# Patient Record
Sex: FEMALE | Race: WHITE | Employment: FULL TIME | URBAN - NONMETROPOLITAN AREA
[De-identification: names, ages, dates, MRNs, and addresses within clinical notes are randomized per-mention and may not be internally consistent; named-entity substitution may affect disease eponyms.]

---

## 2022-12-28 ENCOUNTER — APPOINTMENT (OUTPATIENT)
Dept: GENERAL RADIOLOGY | Age: 27
End: 2022-12-28
Payer: COMMERCIAL

## 2022-12-28 ENCOUNTER — HOSPITAL ENCOUNTER (EMERGENCY)
Age: 27
Discharge: ANOTHER HOSPITAL | End: 2022-12-29
Attending: EMERGENCY MEDICINE
Payer: COMMERCIAL

## 2022-12-28 VITALS
BODY MASS INDEX: 33.32 KG/M2 | HEIGHT: 65 IN | RESPIRATION RATE: 18 BRPM | DIASTOLIC BLOOD PRESSURE: 61 MMHG | TEMPERATURE: 98.4 F | HEART RATE: 108 BPM | SYSTOLIC BLOOD PRESSURE: 112 MMHG | WEIGHT: 200 LBS | OXYGEN SATURATION: 100 %

## 2022-12-28 DIAGNOSIS — E10.10 DIABETIC KETOACIDOSIS WITHOUT COMA ASSOCIATED WITH TYPE 1 DIABETES MELLITUS (HCC): Primary | ICD-10-CM

## 2022-12-28 LAB
ALBUMIN SERPL-MCNC: 4.4 GM/DL (ref 3.4–5)
ALP BLD-CCNC: 90 IU/L (ref 40–129)
ALT SERPL-CCNC: 11 U/L (ref 10–40)
ANION GAP SERPL CALCULATED.3IONS-SCNC: 19 MMOL/L (ref 4–16)
ANION GAP SERPL CALCULATED.3IONS-SCNC: 21 MMOL/L (ref 4–16)
AST SERPL-CCNC: 13 IU/L (ref 15–37)
BASE EXCESS MIXED: 11.7 (ref 0–2.3)
BASE EXCESS: ABNORMAL (ref 0–2.4)
BASOPHILS ABSOLUTE: 0.1 K/CU MM
BASOPHILS RELATIVE PERCENT: 0.3 % (ref 0–1)
BETA-HYDROXYBUTYRATE: >20.8 MG/DL (ref 0–3)
BILIRUB SERPL-MCNC: 0.7 MG/DL (ref 0–1)
BILIRUBIN URINE: NEGATIVE MG/DL
BLOOD, URINE: NEGATIVE
BUN BLDV-MCNC: 18 MG/DL (ref 6–23)
BUN BLDV-MCNC: 20 MG/DL (ref 6–23)
CALCIUM SERPL-MCNC: 10.3 MG/DL (ref 8.3–10.6)
CALCIUM SERPL-MCNC: 8.6 MG/DL (ref 8.3–10.6)
CHLORIDE BLD-SCNC: 106 MMOL/L (ref 99–110)
CHLORIDE BLD-SCNC: 97 MMOL/L (ref 99–110)
CHP ED QC CHECK: NORMAL
CHP ED QC CHECK: NORMAL
CLARITY: CLEAR
CO2 CONTENT: 14.8 MMOL/L (ref 19–24)
CO2: 13 MMOL/L (ref 21–32)
CO2: 16 MMOL/L (ref 21–32)
COLOR: YELLOW
COMMENT UA: ABNORMAL
CREAT SERPL-MCNC: 0.7 MG/DL (ref 0.6–1.1)
CREAT SERPL-MCNC: 0.7 MG/DL (ref 0.6–1.1)
DIFFERENTIAL TYPE: ABNORMAL
EOSINOPHILS ABSOLUTE: 0 K/CU MM
EOSINOPHILS RELATIVE PERCENT: 0.1 % (ref 0–3)
GFR SERPL CREATININE-BSD FRML MDRD: >60 ML/MIN/1.73M2
GFR SERPL CREATININE-BSD FRML MDRD: >60 ML/MIN/1.73M2
GLUCOSE BLD-MCNC: 232 MG/DL
GLUCOSE BLD-MCNC: 232 MG/DL (ref 70–99)
GLUCOSE BLD-MCNC: 287 MG/DL
GLUCOSE BLD-MCNC: 287 MG/DL (ref 70–99)
GLUCOSE BLD-MCNC: 331 MG/DL (ref 70–99)
GLUCOSE BLD-MCNC: 350 MG/DL (ref 70–99)
GLUCOSE BLD-MCNC: 362 MG/DL (ref 70–99)
GLUCOSE BLD-MCNC: 474 MG/DL (ref 70–99)
GLUCOSE BLD-MCNC: 488 MG/DL (ref 70–99)
GLUCOSE, URINE: >1000 MG/DL
HCG QUALITATIVE: NEGATIVE
HCO3 VENOUS: 13.8 MMOL/L (ref 19–25)
HCT VFR BLD CALC: 40.8 % (ref 37–47)
HEMOGLOBIN: 13.5 GM/DL (ref 12.5–16)
IMMATURE NEUTROPHIL %: 0.4 % (ref 0–0.43)
KETONES, URINE: >80 MG/DL
LACTIC ACID, SEPSIS: 3.3 MMOL/L (ref 0.5–1.9)
LACTIC ACID, SEPSIS: 4 MMOL/L (ref 0.5–1.9)
LEUKOCYTE ESTERASE, URINE: NEGATIVE
LYMPHOCYTES ABSOLUTE: 1.7 K/CU MM
LYMPHOCYTES RELATIVE PERCENT: 8.8 % (ref 24–44)
MCH RBC QN AUTO: 28.4 PG (ref 27–31)
MCHC RBC AUTO-ENTMCNC: 33.1 % (ref 32–36)
MCV RBC AUTO: 85.9 FL (ref 78–100)
MONOCYTES ABSOLUTE: 0.5 K/CU MM
MONOCYTES RELATIVE PERCENT: 2.4 % (ref 0–4)
NITRITE URINE, QUANTITATIVE: NEGATIVE
O2 SAT, VEN: 90.9 % (ref 50–70)
PCO2, VEN: 30 MMHG (ref 38–52)
PDW BLD-RTO: 13.4 % (ref 11.7–14.9)
PH VENOUS: 7.27 (ref 7.32–7.42)
PH, URINE: 5.5 (ref 5–8)
PLATELET # BLD: 303 K/CU MM (ref 140–440)
PMV BLD AUTO: 10.8 FL (ref 7.5–11.1)
PO2, VEN: 67.8 MMHG (ref 28–48)
POTASSIUM SERPL-SCNC: 3.6 MMOL/L (ref 3.5–5.1)
POTASSIUM SERPL-SCNC: 4.8 MMOL/L (ref 3.5–5.1)
PROTEIN UA: NEGATIVE MG/DL
RBC # BLD: 4.75 M/CU MM (ref 4.2–5.4)
SEGMENTED NEUTROPHILS ABSOLUTE COUNT: 16.7 K/CU MM
SEGMENTED NEUTROPHILS RELATIVE PERCENT: 88 % (ref 36–66)
SODIUM BLD-SCNC: 134 MMOL/L (ref 135–145)
SODIUM BLD-SCNC: 138 MMOL/L (ref 135–145)
SOURCE, BLOOD GAS: ABNORMAL
SPECIFIC GRAVITY UA: 1.02 (ref 1–1.03)
TOTAL IMMATURE NEUTOROPHIL: 0.07 K/CU MM
TOTAL PROTEIN: 7.7 GM/DL (ref 6.4–8.2)
UROBILINOGEN, URINE: 0.2 MG/DL (ref 0.2–1)
WBC # BLD: 19 K/CU MM (ref 4–10.5)

## 2022-12-28 PROCEDURE — 96375 TX/PRO/DX INJ NEW DRUG ADDON: CPT

## 2022-12-28 PROCEDURE — 6360000002 HC RX W HCPCS: Performed by: EMERGENCY MEDICINE

## 2022-12-28 PROCEDURE — 96365 THER/PROPH/DIAG IV INF INIT: CPT

## 2022-12-28 PROCEDURE — 85025 COMPLETE CBC W/AUTO DIFF WBC: CPT

## 2022-12-28 PROCEDURE — 84703 CHORIONIC GONADOTROPIN ASSAY: CPT

## 2022-12-28 PROCEDURE — 80048 BASIC METABOLIC PNL TOTAL CA: CPT

## 2022-12-28 PROCEDURE — 2580000003 HC RX 258: Performed by: EMERGENCY MEDICINE

## 2022-12-28 PROCEDURE — 71045 X-RAY EXAM CHEST 1 VIEW: CPT

## 2022-12-28 PROCEDURE — 82962 GLUCOSE BLOOD TEST: CPT

## 2022-12-28 PROCEDURE — 6370000000 HC RX 637 (ALT 250 FOR IP): Performed by: EMERGENCY MEDICINE

## 2022-12-28 PROCEDURE — 82805 BLOOD GASES W/O2 SATURATION: CPT

## 2022-12-28 PROCEDURE — 82010 KETONE BODYS QUAN: CPT

## 2022-12-28 PROCEDURE — 81003 URINALYSIS AUTO W/O SCOPE: CPT

## 2022-12-28 PROCEDURE — 80053 COMPREHEN METABOLIC PANEL: CPT

## 2022-12-28 PROCEDURE — 99285 EMERGENCY DEPT VISIT HI MDM: CPT

## 2022-12-28 PROCEDURE — 96366 THER/PROPH/DIAG IV INF ADDON: CPT

## 2022-12-28 PROCEDURE — 83605 ASSAY OF LACTIC ACID: CPT

## 2022-12-28 PROCEDURE — 96361 HYDRATE IV INFUSION ADD-ON: CPT

## 2022-12-28 PROCEDURE — 96376 TX/PRO/DX INJ SAME DRUG ADON: CPT

## 2022-12-28 RX ORDER — SODIUM CHLORIDE AND POTASSIUM CHLORIDE 150; 900 MG/100ML; MG/100ML
INJECTION, SOLUTION INTRAVENOUS CONTINUOUS
Status: DISCONTINUED | OUTPATIENT
Start: 2022-12-28 | End: 2022-12-29 | Stop reason: HOSPADM

## 2022-12-28 RX ORDER — 0.9 % SODIUM CHLORIDE 0.9 %
2000 INTRAVENOUS SOLUTION INTRAVENOUS ONCE
Status: COMPLETED | OUTPATIENT
Start: 2022-12-28 | End: 2022-12-28

## 2022-12-28 RX ORDER — DEXTROSE AND SODIUM CHLORIDE 5; .45 G/100ML; G/100ML
INJECTION, SOLUTION INTRAVENOUS CONTINUOUS
Status: DISCONTINUED | OUTPATIENT
Start: 2022-12-28 | End: 2022-12-29 | Stop reason: HOSPADM

## 2022-12-28 RX ORDER — ONDANSETRON 2 MG/ML
4 INJECTION INTRAMUSCULAR; INTRAVENOUS EVERY 30 MIN PRN
Status: DISCONTINUED | OUTPATIENT
Start: 2022-12-28 | End: 2022-12-29 | Stop reason: HOSPADM

## 2022-12-28 RX ORDER — SODIUM CHLORIDE 9 MG/ML
INJECTION, SOLUTION INTRAVENOUS CONTINUOUS
Status: DISCONTINUED | OUTPATIENT
Start: 2022-12-28 | End: 2022-12-28

## 2022-12-28 RX ORDER — DEXTROSE MONOHYDRATE 100 MG/ML
INJECTION, SOLUTION INTRAVENOUS CONTINUOUS PRN
Status: DISCONTINUED | OUTPATIENT
Start: 2022-12-28 | End: 2022-12-29 | Stop reason: HOSPADM

## 2022-12-28 RX ADMIN — POTASSIUM CHLORIDE AND SODIUM CHLORIDE: 900; 150 INJECTION, SOLUTION INTRAVENOUS at 21:32

## 2022-12-28 RX ADMIN — INSULIN HUMAN 10 UNITS: 100 INJECTION, SOLUTION PARENTERAL at 17:03

## 2022-12-28 RX ADMIN — SODIUM CHLORIDE 2000 ML: 9 INJECTION, SOLUTION INTRAVENOUS at 17:08

## 2022-12-28 RX ADMIN — DEXTROSE AND SODIUM CHLORIDE: 5; 450 INJECTION, SOLUTION INTRAVENOUS at 23:43

## 2022-12-28 RX ADMIN — ONDANSETRON 4 MG: 2 INJECTION INTRAMUSCULAR; INTRAVENOUS at 17:01

## 2022-12-28 RX ADMIN — SODIUM CHLORIDE: 9 INJECTION, SOLUTION INTRAVENOUS at 19:49

## 2022-12-28 RX ADMIN — INSULIN HUMAN 10 UNITS: 100 INJECTION, SOLUTION PARENTERAL at 19:24

## 2022-12-28 RX ADMIN — SODIUM CHLORIDE 0.1 UNITS/KG/HR: 9 INJECTION, SOLUTION INTRAVENOUS at 21:03

## 2022-12-28 ASSESSMENT — PAIN DESCRIPTION - LOCATION: LOCATION: GENERALIZED

## 2022-12-28 ASSESSMENT — PAIN SCALES - GENERAL: PAINLEVEL_OUTOF10: 3

## 2022-12-28 ASSESSMENT — PAIN - FUNCTIONAL ASSESSMENT: PAIN_FUNCTIONAL_ASSESSMENT: 0-10

## 2022-12-28 NOTE — ED TRIAGE NOTES
Patient arrived ambulatory with c/o hyperglycemia. 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 She noticed her glucose was running high and is not coming down with her insulin. Patient has an insulin pump and dexcom. Respirations equal and unlabored, skin PWD.

## 2022-12-28 NOTE — ED PROVIDER NOTES
Emergency Department Encounter  Location: [facility] [address] [place] [address]    Patient: Everett Lopez  MRN: [id number]  : 1995  Date of evaluation: 2022  ED Provider: Angélica Lopez DO, FACEP    Chief Complaint:    Hyperglycemia and Nausea    Eek:  Everett Lopez is a 33 y.o. female that presents to the emergency department history of insulin-dependent diabetes. The patient has an insulin pump. 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 The patient states she did not eat very well yesterday and had some poor nights of sleep. She traveled here from [place] yesterday and when she arrived here she slept for an excessively long time and her insulin pump ran out. The patient's blood sugar has been reading greater than 400 for the last few hours and she has been bolusing insulin. She states she is bolus total of 83 units of NovoLog and her blood pressure remains in the greater than 400 range. It was 435 upon arrival.  The patient has been vomiting and has been having some dry heaves. She did not check her urine ketones. She denies upper respiratory symptoms or infective process at this time. She states normally she is able to get her self back on track with insulin boluses when something like this happens but today she is not able to get her sugar to come down with insulin boluses. ROS - see HPI, below listed is current ROS at time of my eval:  At least 10 systems reviewed and otherwise acutely negative except as in the [address]   General:  No fevers, no chills, no weakness  Eyes:  No recent vison changes, no discharge  ENT:  No sore throat, no nasal congestion, no hearing changes  Cardiovascular:  No chest pain, no palpitations  Respiratory:  No shortness of breath, no cough, no wheezing  Gastrointestinal: Positive for nausea with vomiting no diarrhea  Musculoskeletal:  No muscle pain, no joint pain  Skin:  No rash, no pruritis, no easy bruising  Neurologic: No speech problems, no headache, no extremity numbness, no extremity tingling, no extremity weakness  Psychiatric:  No anxiety  Genitourinary:  No dysuria, no hematuria  Endocrine:  No unexpected weight gain, no unexpected weight loss  Extremities:  no edema, no pain    No past medical history on file. No past surgical history on file. No family history on file. Social History     Socioeconomic History    Marital status:      Spouse name: Not on file    Number of children: Not on file    Years of education: Not on file    Highest education level: Not on file   Occupational History    Not on file   Tobacco Use    Smoking status: Not on file    Smokeless tobacco: Not on file   Substance and Sexual Activity    Alcohol use: Not on file    Drug use: Not on file    Sexual activity: Not on file   Other Topics Concern    Not on file   Social History Narrative    Not on file     Social Determinants of Health     Financial Resource Strain: Not on file   Food Insecurity: Not on file   Transportation Needs: Not on file   Physical Activity: Not on file   Stress: Not on file   Social Connections: Not on file   Intimate Partner Violence: Not on file   Housing Stability: Not on file     Current Facility-Administered Medications   Medication Dose Route Frequency Provider Last Rate Last Admin    ondansetron (ZOFRAN) injection 4 mg  4 mg IntraVENous Q30 Min PRN Deborah Ferrera DO   4 mg at 12/28/22 1701    0.9 % sodium chloride infusion   IntraVENous Continuous Deborah Ferrera DO         No current outpatient medications on file. No Known Allergies    Nursing Notes Reviewed    Physical Exam:  ED Triage Vitals [12/28/22 1634]   Enc Vitals Group      /60      Heart Rate (!) 109      Resp 18      Temp 98.4 °F (36.9 °C)      Temp Source Oral      SpO2 99 %      Weight 200 lb (90.7 kg)      Height 5' 5\" (1.651 m)      Head Circumference       Peak Flow       Pain Score       Pain Loc       Pain Edu? Excl.  in [address]? GENERAL APPEARANCE: Awake and alert. Cooperative. No acute distress. Non-toxic in appearance. She does not smell ketones. HEAD: Normocephalic. Atraumatic. EYES: EOM's grossly intact. Sclera anicteric. ENT: Tolerates saliva. No trismus. NECK: Supple. Trachea midline. CARDIO: RRR. Radial pulse 2+. LUNGS: Respirations unlabored. CTAB. No wheezes rales or rhonchi  ABDOMEN: Soft. Non-distended. Non-tender. No guarding or rebound  EXTREMITIES: No acute deformities. SKIN: Warm and dry. NEUROLOGICAL: No gross facial drooping. Moves all 4 extremities spontaneously. PSYCHIATRIC: Normal mood.      Labs:  Results for orders placed or performed during the hospital encounter of 12/28/22   CBC with Auto Differential   Result Value Ref Range    WBC 19.0 (H) 4.0 - 10.5 K/CU MM    RBC 4.75 4.2 - 5.4 M/CU MM    Hemoglobin 13.5 12.5 - 16.0 GM/DL    Hematocrit 40.8 37 - 47 %    MCV 85.9 78 - 100 FL    MCH 28.4 27 - 31 PG    MCHC 33.1 32.0 - 36.0 %    RDW 13.4 11.7 - 14.9 %    Platelets 746 141 - 585 K/CU MM    MPV 10.8 7.5 - 11.1 FL    Differential Type AUTOMATED DIFFERENTIAL     Segs Relative 88.0 (H) 36 - 66 %    Lymphocytes % 8.8 (L) 24 - 44 %    Monocytes % 2.4 0 - 4 %    Eosinophils % 0.1 0 - 3 %    Basophils % 0.3 0 - 1 %    Segs Absolute 16.7 K/CU MM    Lymphocytes Absolute 1.7 K/CU MM    Monocytes Absolute 0.5 K/CU MM    Eosinophils Absolute 0.0 K/CU MM    Basophils Absolute 0.1 K/CU MM    Immature Neutrophil % 0.4 0 - 0.43 %    Total Immature Neutrophil 0.07 K/CU MM   Comprehensive Metabolic Panel w/ Reflex to MG   Result Value Ref Range    Sodium 134 (L) 135 - 145 MMOL/L    Potassium 4.8 3.5 - 5.1 MMOL/L    Chloride 97 (L) 99 - 110 mMol/L    CO2 16 (L) 21 - 32 MMOL/L    BUN 20 6 - 23 MG/DL    Creatinine 0.7 0.6 - 1.1 MG/DL    Est, Glom Filt Rate >60 >60 mL/min/1.73m2    Glucose 488 (HH) 70 - 99 MG/DL    Calcium 10.3 8.3 - 10.6 MG/DL    Albumin 4.4 3.4 - 5.0 GM/DL    Total Protein 7.7 6.4 - 8.2 GM/DL Total Bilirubin 0.7 0.0 - 1.0 MG/DL    ALT 11 10 - 40 U/L    AST 13 (L) 15 - 37 IU/L    Alkaline Phosphatase 90 40 - 129 IU/L    Anion Gap 21 (H) 4 - 16   Beta-Hydroxybutyrate   Result Value Ref Range    Beta-Hydroxybutyrate >20.8 (H) 0.0 - 3.0 MG/DL   Urinalysis   Result Value Ref Range    Color, UA YELLOW YELLOW    Clarity, UA CLEAR CLEAR    Glucose, Urine >1,000 (A) NEGATIVE MG/DL    Bilirubin Urine NEGATIVE NEGATIVE MG/DL    Ketones, Urine >80 (A) NEGATIVE MG/DL    Specific Gravity, UA 1.025 1.001 - 1.035    Blood, Urine NEGATIVE NEGATIVE    pH, Urine 5.5 5.0 - 8.0    Protein, UA NEGATIVE NEGATIVE MG/DL    Urobilinogen, Urine 0.2 0.2 - 1.0 MG/DL    Nitrite Urine, Quantitative NEGATIVE NEGATIVE    Leukocyte Esterase, Urine NEGATIVE NEGATIVE    Urinalysis Comments       Microscopic exam not performed based on chemical results unless requested in original order. HCG Serum, Qualitative   Result Value Ref Range    hCG Qual NEGATIVE    POCT Glucose   Result Value Ref Range    POC Glucose 474 (H) 70 - 99 MG/DL   POCT Venous   Result Value Ref Range    pH, Rbuio 7.27 (L) 7.32 - 7.42    pCO2, Rubio 30.0 (L) 38 - 52 mmHG    pO2, Rubio 67.8 (H) 28 - 48 mmHG    Base Exc, Mixed 11.7 (H) 0 - 2.3    Base Excess MINUS 0 - 2.4    HCO3, Venous 13.8 (L) 19 - 25 MMOL/L    O2 Sat, Rubio 90.9 (H) 50 - 70 %    CO2 Content 14.8 (L) 19 - 24 MMOL/L    Source: Venous    POCT Glucose   Result Value Ref Range    POC Glucose 350 (H) 70 - 99 MG/DL         Radiographs (if obtained):  [] The following radiograph was interpreted by myself in the absence of a radiologist:  [x] Radiologist's Report reviewed at time of ED visit:  XR CHEST PORTABLE    (Results Pending)       ED Course and MDM:  This patient presents to the emergency department with nausea and vomiting and a blood sugar greater than 400. The patient has an insulin pump.   She states she replaced the site of her pump and opened a new vial of insulin but her blood sugar Reading greater than 400 which is the max that she has on her automatic reader associated with the pump. It has been giving her insulin through the day and she states she is had total of 83 units throughout the day. Here in the ER the patient is showing signs of DKA. She has ketones greater than 20 in her urine and her blood sugar was initially registered in the 480 range. She has been given 10 units of insulin IV and her blood sugar did come down into the 300s. She is feeling much better after having 2 L of fluid however her sugar started to go back up. She is had another 10 units of insulin. Her sugar most recently read 351. Her urine shows no evidence of infection and the chest x-ray is pending. She is having no symptoms of upper respiratory symptoms or other signs of infection. Her potassium is normal.  She has required no supplementation at this point. The patient may need to be admitted to the hospital but she is from out of town and she would like to stay out of the hospital if possible. Care will be transferred to the [facility] doctor with the chest x-ray and the serum lactate pending. Please see her note regarding final disposition of this patient based on the findings of the x-ray and her response to our treatment. Final Impression:  1. Diabetic ketoacidosis without coma associated with type 1 diabetes mellitus ([facility] [address]      DISPOSITION     Patient referred to: No follow-up provider specified.   Discharge medications:  New Prescriptions    No medications on file     (Please note that portions of this note may have been completed with a voice recognition program. Efforts were made to edit the dictations but occasionally words are mis-transcribed.)    Irina Blanco DO, [address]  Board certified in [address] [place] [address]  12/28/22 2483

## 2022-12-29 ENCOUNTER — HOSPITAL ENCOUNTER (INPATIENT)
Age: 27
LOS: 1 days | Discharge: HOME | DRG: 639 | End: 2022-12-29
Attending: STUDENT IN AN ORGANIZED HEALTH CARE EDUCATION/TRAINING PROGRAM | Admitting: STUDENT IN AN ORGANIZED HEALTH CARE EDUCATION/TRAINING PROGRAM
Payer: COMMERCIAL

## 2022-12-29 VITALS
BODY MASS INDEX: 32.25 KG/M2 | DIASTOLIC BLOOD PRESSURE: 73 MMHG | SYSTOLIC BLOOD PRESSURE: 110 MMHG | OXYGEN SATURATION: 99 % | WEIGHT: 193.56 LBS | TEMPERATURE: 98.9 F | HEART RATE: 95 BPM | HEIGHT: 65 IN | RESPIRATION RATE: 18 BRPM

## 2022-12-29 PROBLEM — E13.10 SECONDARY DM WITH DKA (HCC): Status: ACTIVE | Noted: 2022-12-29

## 2022-12-29 PROBLEM — E10.10 DKA, TYPE 1, NOT AT GOAL (HCC): Status: ACTIVE | Noted: 2022-12-29

## 2022-12-29 LAB
ADENOVIRUS DETECTION BY PCR: NOT DETECTED
ANION GAP SERPL CALCULATED.3IONS-SCNC: 11 MMOL/L (ref 4–16)
BASOPHILS ABSOLUTE: 0.1 K/CU MM
BASOPHILS RELATIVE PERCENT: 0.4 % (ref 0–1)
BETA-HYDROXYBUTYRATE: 11.7 MG/DL (ref 0–3)
BORDETELLA PARAPERTUSSIS BY PCR: NOT DETECTED
BORDETELLA PERTUSSIS PCR: NOT DETECTED
BUN BLDV-MCNC: 14 MG/DL (ref 6–23)
CALCIUM SERPL-MCNC: 8.5 MG/DL (ref 8.3–10.6)
CHLAMYDOPHILA PNEUMONIA PCR: NOT DETECTED
CHLORIDE BLD-SCNC: 110 MMOL/L (ref 99–110)
CO2: 18 MMOL/L (ref 21–32)
CORONAVIRUS 229E PCR: NOT DETECTED
CORONAVIRUS HKU1 PCR: NOT DETECTED
CORONAVIRUS NL63 PCR: NOT DETECTED
CORONAVIRUS OC43 PCR: NOT DETECTED
CREAT SERPL-MCNC: 0.5 MG/DL (ref 0.6–1.1)
DIFFERENTIAL TYPE: ABNORMAL
EOSINOPHILS ABSOLUTE: 0.2 K/CU MM
EOSINOPHILS RELATIVE PERCENT: 1.1 % (ref 0–3)
GFR SERPL CREATININE-BSD FRML MDRD: >60 ML/MIN/1.73M2
GLUCOSE BLD-MCNC: 104 MG/DL (ref 70–99)
GLUCOSE BLD-MCNC: 130 MG/DL (ref 70–99)
GLUCOSE BLD-MCNC: 168 MG/DL (ref 70–99)
GLUCOSE BLD-MCNC: 171 MG/DL (ref 70–99)
GLUCOSE BLD-MCNC: 198 MG/DL (ref 70–99)
GLUCOSE BLD-MCNC: 224 MG/DL (ref 70–99)
GLUCOSE BLD-MCNC: 248 MG/DL (ref 70–99)
GLUCOSE BLD-MCNC: 261 MG/DL (ref 70–99)
GLUCOSE BLD-MCNC: 346 MG/DL (ref 70–99)
GLUCOSE BLD-MCNC: 89 MG/DL (ref 70–99)
GLUCOSE BLD-MCNC: 93 MG/DL (ref 70–99)
HCT VFR BLD CALC: 34.8 % (ref 37–47)
HEMOGLOBIN: 11 GM/DL (ref 12.5–16)
HUMAN METAPNEUMOVIRUS PCR: NOT DETECTED
IMMATURE NEUTROPHIL %: 0.3 % (ref 0–0.43)
INFLUENZA A BY PCR: NOT DETECTED
INFLUENZA A H1 (2009) PCR: NOT DETECTED
INFLUENZA A H1 PANDEMIC PCR: NOT DETECTED
INFLUENZA A H3 PCR: NOT DETECTED
INFLUENZA B BY PCR: NOT DETECTED
LACTATE: 1.4 MMOL/L (ref 0.5–1.9)
LYMPHOCYTES ABSOLUTE: 4.3 K/CU MM
LYMPHOCYTES RELATIVE PERCENT: 27.1 % (ref 24–44)
MAGNESIUM: 1.7 MG/DL (ref 1.8–2.4)
MCH RBC QN AUTO: 28.1 PG (ref 27–31)
MCHC RBC AUTO-ENTMCNC: 31.6 % (ref 32–36)
MCV RBC AUTO: 88.8 FL (ref 78–100)
MONOCYTES ABSOLUTE: 1.3 K/CU MM
MONOCYTES RELATIVE PERCENT: 8.1 % (ref 0–4)
MYCOPLASMA PNEUMONIAE PCR: NOT DETECTED
NUCLEATED RBC %: 0 %
PARAINFLUENZA 1 PCR: NOT DETECTED
PARAINFLUENZA 2 PCR: NOT DETECTED
PARAINFLUENZA 3 PCR: NOT DETECTED
PARAINFLUENZA 4 PCR: NOT DETECTED
PDW BLD-RTO: 13.7 % (ref 11.7–14.9)
PHOSPHORUS: 1.6 MG/DL (ref 2.5–4.9)
PLATELET # BLD: 287 K/CU MM (ref 140–440)
PMV BLD AUTO: 10.8 FL (ref 7.5–11.1)
POTASSIUM SERPL-SCNC: 4.2 MMOL/L (ref 3.5–5.1)
RBC # BLD: 3.92 M/CU MM (ref 4.2–5.4)
RHINOVIRUS ENTEROVIRUS PCR: NOT DETECTED
RSV PCR: NOT DETECTED
SARS-COV-2: NOT DETECTED
SEGMENTED NEUTROPHILS ABSOLUTE COUNT: 10 K/CU MM
SEGMENTED NEUTROPHILS RELATIVE PERCENT: 63 % (ref 36–66)
SODIUM BLD-SCNC: 139 MMOL/L (ref 135–145)
TOTAL IMMATURE NEUTOROPHIL: 0.05 K/CU MM
TOTAL NUCLEATED RBC: 0 K/CU MM
WBC # BLD: 15.8 K/CU MM (ref 4–10.5)

## 2022-12-29 PROCEDURE — 0202U NFCT DS 22 TRGT SARS-COV-2: CPT

## 2022-12-29 PROCEDURE — 83735 ASSAY OF MAGNESIUM: CPT

## 2022-12-29 PROCEDURE — 85025 COMPLETE CBC W/AUTO DIFF WBC: CPT

## 2022-12-29 PROCEDURE — 82962 GLUCOSE BLOOD TEST: CPT

## 2022-12-29 PROCEDURE — 84100 ASSAY OF PHOSPHORUS: CPT

## 2022-12-29 PROCEDURE — 2580000003 HC RX 258: Performed by: STUDENT IN AN ORGANIZED HEALTH CARE EDUCATION/TRAINING PROGRAM

## 2022-12-29 PROCEDURE — 83605 ASSAY OF LACTIC ACID: CPT

## 2022-12-29 PROCEDURE — 6370000000 HC RX 637 (ALT 250 FOR IP): Performed by: STUDENT IN AN ORGANIZED HEALTH CARE EDUCATION/TRAINING PROGRAM

## 2022-12-29 PROCEDURE — 94761 N-INVAS EAR/PLS OXIMETRY MLT: CPT

## 2022-12-29 PROCEDURE — 2000000000 HC ICU R&B

## 2022-12-29 PROCEDURE — 6360000002 HC RX W HCPCS: Performed by: STUDENT IN AN ORGANIZED HEALTH CARE EDUCATION/TRAINING PROGRAM

## 2022-12-29 PROCEDURE — 82010 KETONE BODYS QUAN: CPT

## 2022-12-29 PROCEDURE — 80048 BASIC METABOLIC PNL TOTAL CA: CPT

## 2022-12-29 PROCEDURE — 2500000003 HC RX 250 WO HCPCS: Performed by: STUDENT IN AN ORGANIZED HEALTH CARE EDUCATION/TRAINING PROGRAM

## 2022-12-29 RX ORDER — METHOCARBAMOL 750 MG/1
750 TABLET, FILM COATED ORAL PRN
COMMUNITY

## 2022-12-29 RX ORDER — POLYETHYLENE GLYCOL 3350 17 G/17G
17 POWDER, FOR SOLUTION ORAL DAILY PRN
Status: DISCONTINUED | OUTPATIENT
Start: 2022-12-29 | End: 2022-12-29 | Stop reason: HOSPADM

## 2022-12-29 RX ORDER — ONDANSETRON 4 MG/1
4 TABLET, ORALLY DISINTEGRATING ORAL EVERY 8 HOURS PRN
Status: DISCONTINUED | OUTPATIENT
Start: 2022-12-29 | End: 2022-12-29 | Stop reason: HOSPADM

## 2022-12-29 RX ORDER — NORGESTIMATE AND ETHINYL ESTRADIOL 0.25-0.035
1 KIT ORAL DAILY
COMMUNITY

## 2022-12-29 RX ORDER — INSULIN LISPRO 100 [IU]/ML
0-4 INJECTION, SOLUTION INTRAVENOUS; SUBCUTANEOUS NIGHTLY
Status: DISCONTINUED | OUTPATIENT
Start: 2022-12-29 | End: 2022-12-29 | Stop reason: HOSPADM

## 2022-12-29 RX ORDER — INSULIN LISPRO 100 [IU]/ML
0-16 INJECTION, SOLUTION INTRAVENOUS; SUBCUTANEOUS
Qty: 15 ML | Refills: 0 | Status: SHIPPED | OUTPATIENT
Start: 2022-12-29 | End: 2022-12-29 | Stop reason: HOSPADM

## 2022-12-29 RX ORDER — SODIUM CHLORIDE 450 MG/100ML
INJECTION, SOLUTION INTRAVENOUS CONTINUOUS
Status: DISCONTINUED | OUTPATIENT
Start: 2022-12-29 | End: 2022-12-29

## 2022-12-29 RX ORDER — DEXTROSE, SODIUM CHLORIDE, AND POTASSIUM CHLORIDE 5; .45; .15 G/100ML; G/100ML; G/100ML
INJECTION INTRAVENOUS CONTINUOUS PRN
Status: DISCONTINUED | OUTPATIENT
Start: 2022-12-29 | End: 2022-12-29

## 2022-12-29 RX ORDER — POTASSIUM CHLORIDE 7.45 MG/ML
10 INJECTION INTRAVENOUS PRN
Status: DISCONTINUED | OUTPATIENT
Start: 2022-12-29 | End: 2022-12-29 | Stop reason: HOSPADM

## 2022-12-29 RX ORDER — FERROUS SULFATE 325(65) MG
325 TABLET ORAL EVERY OTHER DAY
COMMUNITY

## 2022-12-29 RX ORDER — ENOXAPARIN SODIUM 100 MG/ML
40 INJECTION SUBCUTANEOUS DAILY
Status: DISCONTINUED | OUTPATIENT
Start: 2022-12-29 | End: 2022-12-29 | Stop reason: HOSPADM

## 2022-12-29 RX ORDER — INSULIN LISPRO 100 [IU]/ML
0-16 INJECTION, SOLUTION INTRAVENOUS; SUBCUTANEOUS
Status: DISCONTINUED | OUTPATIENT
Start: 2022-12-29 | End: 2022-12-29 | Stop reason: HOSPADM

## 2022-12-29 RX ORDER — LORATADINE 10 MG/1
10 TABLET ORAL DAILY
COMMUNITY

## 2022-12-29 RX ORDER — LISDEXAMFETAMINE DIMESYLATE 40 MG/1
40 CAPSULE ORAL DAILY
COMMUNITY

## 2022-12-29 RX ORDER — INSULIN GLARGINE 100 [IU]/ML
45 INJECTION, SOLUTION SUBCUTANEOUS DAILY
Status: DISCONTINUED | OUTPATIENT
Start: 2022-12-29 | End: 2022-12-29 | Stop reason: HOSPADM

## 2022-12-29 RX ORDER — INSULIN GLARGINE 100 [IU]/ML
45 INJECTION, SOLUTION SUBCUTANEOUS DAILY
Qty: 15 ML | Refills: 0 | Status: SHIPPED | OUTPATIENT
Start: 2022-12-30

## 2022-12-29 RX ORDER — MAGNESIUM SULFATE 1 G/100ML
1000 INJECTION INTRAVENOUS PRN
Status: DISCONTINUED | OUTPATIENT
Start: 2022-12-29 | End: 2022-12-29 | Stop reason: HOSPADM

## 2022-12-29 RX ORDER — HYDROXYCHLOROQUINE SULFATE 200 MG/1
200 TABLET, FILM COATED ORAL DAILY
COMMUNITY

## 2022-12-29 RX ORDER — SODIUM CHLORIDE 9 MG/ML
INJECTION, SOLUTION INTRAVENOUS CONTINUOUS
Status: DISCONTINUED | OUTPATIENT
Start: 2022-12-29 | End: 2022-12-29

## 2022-12-29 RX ORDER — ACETAMINOPHEN 325 MG/1
650 TABLET ORAL EVERY 4 HOURS PRN
Status: DISCONTINUED | OUTPATIENT
Start: 2022-12-29 | End: 2022-12-29 | Stop reason: HOSPADM

## 2022-12-29 RX ORDER — INSULIN LISPRO 100 [IU]/ML
10 INJECTION, SOLUTION INTRAVENOUS; SUBCUTANEOUS
Status: DISCONTINUED | OUTPATIENT
Start: 2022-12-29 | End: 2022-12-29 | Stop reason: HOSPADM

## 2022-12-29 RX ADMIN — POTASSIUM CHLORIDE 10 MEQ: 7.46 INJECTION, SOLUTION INTRAVENOUS at 05:51

## 2022-12-29 RX ADMIN — SODIUM CHLORIDE 0.05 UNITS/KG/HR: 9 INJECTION, SOLUTION INTRAVENOUS at 01:53

## 2022-12-29 RX ADMIN — INSULIN LISPRO 12 UNITS: 100 INJECTION, SOLUTION INTRAVENOUS; SUBCUTANEOUS at 12:32

## 2022-12-29 RX ADMIN — POTASSIUM CHLORIDE, DEXTROSE MONOHYDRATE AND SODIUM CHLORIDE: 150; 5; 450 INJECTION, SOLUTION INTRAVENOUS at 01:55

## 2022-12-29 RX ADMIN — SODIUM PHOSPHATE, MONOBASIC, MONOHYDRATE 15 MMOL: 276; 142 INJECTION, SOLUTION INTRAVENOUS at 04:00

## 2022-12-29 RX ADMIN — POTASSIUM CHLORIDE 10 MEQ: 7.46 INJECTION, SOLUTION INTRAVENOUS at 03:57

## 2022-12-29 RX ADMIN — INSULIN LISPRO 10 UNITS: 100 INJECTION, SOLUTION INTRAVENOUS; SUBCUTANEOUS at 12:31

## 2022-12-29 RX ADMIN — POTASSIUM CHLORIDE 10 MEQ: 7.46 INJECTION, SOLUTION INTRAVENOUS at 07:42

## 2022-12-29 RX ADMIN — INSULIN GLARGINE 45 UNITS: 100 INJECTION, SOLUTION SUBCUTANEOUS at 12:33

## 2022-12-29 ASSESSMENT — PAIN SCALES - GENERAL
PAINLEVEL_OUTOF10: 0
PAINLEVEL_OUTOF10: 0

## 2022-12-29 NOTE — PROGRESS NOTES
4 Eyes Skin Assessment     NAME:  Eddie Montgomery  YOB: 1995  MEDICAL RECORD NUMBER:  [id number]    The patient is being assessed for  Admission    I agree that One RN have performed a thorough Head to Toe Skin Assessment on the patient. ALL assessment sites listed below have been assessed. Areas assessed by both nurses:    Head, Face, Ears, Shoulders, Back, Chest, Arms, Elbows, Hands, Sacrum. Buttock, Coccyx, Ischium, and Legs. Feet and Heels        Does the Patient have a Wound?  No noted wound(s)       Kojo Prevention initiated by RN: No   Wound Care Orders initiated by RN: No    Pressure Injury (Stage 3,4, Unstageable, DTI, NWPT, and Complex wounds) if present place referral order by RN under : No    New and Established Ostomies, if present place, referral order under : No      Nurse 1 eSignature: Electronically signed by Sami Ignacio RN on 12/29/22 at 5:29 AM EST    **SHARE this note so that the co-signing nurse is able to place an eSignature**    Nurse 2 eSignature: Electronically signed by Andie Miranda RN on 12/29/22 at 5:33 AM EST

## 2022-12-29 NOTE — PROGRESS NOTES
Reviewed with patient and intensivist, and CC Pharmacist regarding pt's insulin. Pt usual dose of insulin in pump; pt is very knowledgeable and comfortable managing her insulin dose. Pt is making appt with home endocrinologist for Tuesday. Pt would be more comfortable to change to lantus pen and humalog pen for her insulin until she gets back home and can visit her doctor for adjusted basal insulin dose. Pt and  comfortable with discharge and insulin dosing. Sent medications to outpatient pharmacy so pt can get medications prior to leaving hospital. Pt and  both verbalize understanding and deny any further questions.

## 2022-12-29 NOTE — PROGRESS NOTES
Dr. Eliot Carranza on zoom with patient at this time.     Electronically signed by Clara Oscar RN on 12/29/2022 at 12:50 AM

## 2022-12-29 NOTE — H&P
V2.0  History and Physical      Name:  Regla Gant /Age/Sex: 1995  (33 y.o. female)   MRN & CSN:  [id number] & [id number] Encounter Date/Time: 2022 3:07 AM EST   Location:  -A PCP: No primary care provider on file. Hospital Day: 1    Assessment and Plan:   Regla Gant is a 33 y.o. female with pmh of type I DM, insulin pump, history of Sjogren's on Plaquenil who presents with DKA. Hospital Problems             Last Modified POA    * (Principal) DKA, type 1, not at goal [facility]) 2022 Yes    Secondary DM with DKA ([facility] [address] 2022 Yes       DKA  Lactic acidosis  Leukocytosis  Nausea and vomiting  Anion gap metabolic acidosis  N.p.o., insulin drip, IV fluids per protocol. Electrolytes replacements as needed, BMPs every 4 hours per DKA protocol. Consider endocrinology consult for possible pump malfunction. Leukocytosis likely reactive in the setting of dehydration in setting of nausea and vomiting and DKA, will hold on antibiotics for now and continue to monitor patient. Sjogren's disease -currently holding Plaquenil, consider restarting    Disposition:   Current Living situation: Home  Expected Disposition: Home  Estimated D/C: 1 to 2 days    Diet Diet NPO   DVT Prophylaxis [x] Lovenox, []  Heparin, [] SCDs, [] Ambulation,  [] Eliquis, [] Xarelto, [] Coumadin   Code Status Full Code   Surrogate Decision Maker/ POA      History from:     patient, electronic medical record    History of Present Illness:     Chief Complaint: DKA, type 1, not at goal [facility])  Regla Gant is a 33 y.o. female with pmh of type I DM, insulin pump, history of Sjogren's on Plaquenil who presents with DKA. 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   Concern for potential pump malfunction, presented to the ER  for further evaluation, blood sugar on arrival more than 400, vital signs unremarkable, lab work notable for a glucose of 474 lactic acid of 4, anion gap 21, initial bicarb of 13, leukocytosis to 19, VBG with a pH of 7.27, PCO2 30. Insulin drip started and patient was transferred from [place] to [facility] for further evaluation and management. Patient doing okay when seen and examined today, sitting in bed in no distress, nausea and vomiting has improved significantly, some abdominal pain present, no fever no chills no rigors, no nasal congestion, no cough no dysuria no hematuria or frequency. No chest pain or shortness of breath. Review of Systems: Need 10 Elements   Review of Systems    10 point review of systems negative except as above. Objective:   No intake or output data in the 24 hours ending 12/29/22 0307   Vitals:   Vitals:    12/29/22 0033 12/29/22 0053 12/29/22 0100 12/29/22 0200   BP: 119/78  112/66 (!) 85/59   Pulse:   (!) 110 (!) 104   Resp:   21 18   Temp: 98.6 °F (37 °C)      TempSrc: Oral      Weight:  193 lb 9 oz (87.8 kg)     Height:  5' 5\" (1.651 m)         Medications Prior to Admission     Prior to Admission medications    Medication Sig Start Date End Date Taking? Authorizing Provider   hydroxychloroquine (PLAQUENIL) 200 MG tablet Take 200 mg by mouth daily   Yes Historical Provider, MD   norgestimate-ethinyl estradiol ([id number] [address]) 0.25-35 MG-MCG per tablet Take 1 tablet by mouth daily   Yes Historical Provider, MD   methocarbamol (ROBAXIN) 750 MG tablet Take 750 mg by mouth as needed   Yes Historical Provider, MD   loratadine (CLARITIN) 10 MG tablet Take 10 mg by mouth daily   Yes Historical Provider, MD   Lisdexamfetamine Dimesylate (VYVANSE) 40 MG CAPS Take 40 mg by mouth daily.    Yes Historical Provider, MD   ferrous sulfate (IRON 325) 325 (65 Fe) MG tablet Take 325 mg by mouth every other day   Yes Historical Provider, MD       Physical Exam: Need 8 Elements   Physical Exam     General: NAD, appears stated age, obese  Eyes: EOMI  ENT: neck supple  Cardiovascular: Normal rate, regular rhythm  Respiratory: Clear to auscultation  Gastrointestinal: Soft, non tender  Genitourinary: no suprapubic tenderness  Musculoskeletal: No edema  Skin: warm, dry  Neuro: Alert. Oriented x4, nonfocal exam, normal speech  Psych: Mood appropriate. Past Medical History:   PMHx No past medical history on file. PSHX:  has no past surgical history on file. Allergies: No Known Allergies  Fam HX:  family history is not on file. Soc HX:   Social History     Socioeconomic History    Marital status:        Medications:   Medications:    enoxaparin  40 mg SubCUTAneous Daily      Infusions:    dextrose 5% and 0.45% NaCl with KCl 20 mEq 150 mL/hr at 12/29/22 0155    insulin 0.075 Units/kg/hr (12/29/22 0252)     PRN Meds: dextrose bolus, 125 mL, PRN   Or  dextrose bolus, 250 mL, PRN  potassium chloride, 10 mEq, PRN  magnesium sulfate, 1,000 mg, PRN  sodium phosphate IVPB, 10 mmol, PRN   Or  sodium phosphate IVPB, 15 mmol, PRN   Or  sodium phosphate IVPB, 20 mmol, PRN  polyethylene glycol, 17 g, Daily PRN  dextrose 5% and 0.45% NaCl with KCl 20 mEq, , Continuous PRN  acetaminophen, 650 mg, Q4H PRN  ondansetron, 4 mg, Q8H PRN        Labs      CBC:   Recent Labs     12/28/22  1645   WBC 19.0*   HGB 13.5        BMP:    Recent Labs     12/28/22  1645 12/28/22  1946 12/28/22  2204 12/28/22  2315 12/29/22  0131   * 138  --   --  139   K 4.8 3.6  --   --  4.2   CL 97* 106  --   --  110   CO2 16* 13*  --   --  18*   BUN 20 18  --   --  14   CREATININE 0.7 0.7  --   --  0.5*   GLUCOSE 488* 362* 287 232 171*     Hepatic:   Recent Labs     12/28/22  1645   AST 13*   ALT 11   BILITOT 0.7   ALKPHOS 90     Lipids: No results found for: CHOL, HDL, TRIG  Hemoglobin A1C: No results found for: LABA1C  TSH: No results found for: TSH  Troponin: No results found for: TROPONINT  Lactic Acid: No results for input(s): LACTA in the last 72 hours.   BNP: No results for input(s): PROBNP in the last 72 hours. UA:  Lab Results   Component Value Date/Time    NITRU NEGATIVE 12/28/2022 04:45 PM    COLORU YELLOW 12/28/2022 04:45 PM    CLARITYU CLEAR 12/28/2022 04:45 PM    SPECGRAV 1.025 12/28/2022 04:45 PM    LEUKOCYTESUR NEGATIVE 12/28/2022 04:45 PM    UROBILINOGEN 0.2 12/28/2022 04:45 PM    BILIRUBINUR NEGATIVE 12/28/2022 04:45 PM    BLOODU NEGATIVE 12/28/2022 04:45 PM    KETUA >80 12/28/2022 04:45 PM     Urine Cultures: No results found for: LABURIN  Blood Cultures: No results found for: BC  No results found for: BLOODCULT2  Organism: No results found for: ORG    Imaging/Diagnostics Last 24 Hours   XR CHEST PORTABLE    Result Date: 12/28/2022  EXAMINATION: ONE XRAY VIEW OF THE CHEST 12/28/2022 7:45 pm COMPARISON: None. HISTORY: ORDERING SYSTEM PROVIDED HISTORY: chest pain TECHNOLOGIST PROVIDED HISTORY: Reason for exam:->chest pain Reason for Exam: nausea, hyperglycemia FINDINGS: No lung infiltrate or consolidation. No pneumothorax or pleural effusion. Heart size is normal.     No acute abnormality. Personally reviewed Lab Studies, Imaging, and EKG.     Electronically signed by Bhavna Heredia MD on 12/29/2022 at 3:07 AM

## 2022-12-29 NOTE — CARE COORDINATION
Chart reviewed. Patient is from out of state; working locally as a travel RN. She does have a PCP in [place]. Patient has insurance that assists with Rx when needed. No needs identified at this time. CM will remain available should needs arise.  Yury Preston, RN

## 2022-12-29 NOTE — CONSULTS
[facility] Tele-Critical Care Consult Note      Name:  Iris Hurtado /Age/Sex: 1995  (33 y.o. female)   MRN & CSN:  [id number] & [id number] Encounter Date/Time: 2022 1:09 AM EST   Location:  -A PCP: No primary care provider on file. Ranjit Rm MD  Consulting Provider: Flip Rios Day: 1    This is a telemedicine visit  Physician Location: [facility] working from home   Patient Location: [facility] at [address] [place] [address] and Recommendations   Pt is a 32 yo F being admitted to the ICU for DKA w/ pmh + for 1DM with an insulin pump, Sjogrens on Plaquenil. Hospital Problems             Last Modified POA    * (Principal) DKA, type 1, not at goal [facility]) 2022 Yes    Secondary DM with DKA ([facility] [address] 2022 Yes     Assessment:  1DM c/b DKA, unclear trigger, potentially pump failure  AGMA 2/2 Above  Leukocytosis, likely reactive no acute signs of infection  Sjogrens on Plaquenil        Plan:  Admit to ICU  NPO  Insulin GTT, IVF per DKA protocol  Electrolyte replacement, Q4hr BMP per DKA protocol  Consider Endocrine consult in am to evaluate pump  Hold off on antibiotics at this time, continue to monitor        Diet Diet NPO   DVT Prophylaxis    PUD Prophylaxis  [x] Lovenox, []  Heparin, [] SCDs, [] Ambulation,  [] Eliquis, [] Xarelto  [] Pepcid  [] Protonix   Code Status Full Code   Surrogate Decision Maker/ POA     Hi  History Obtained From:    patient    History of Present Illness:     Chief Complaint: DKA, type 1, not at goal [facility])    Pt is a 32 yo F being admitted to the ICU for DKA w/ pmh + for 1DM with an insulin pump, Sjogrens on Plaquenil. Pt currently in town visiting friends/family when she noticed that her blood sugars were rising over the past 24 hours with N/V and inability to keep food down.  Attempted to move her pump site with persistently rising sugars, so decided to come into the ED on  for further evaluation given BS > 400 on several readings. ED VS unremarkable with initial labs notable for Glucose of 474, LA 4, AG 21, BHB > 20.8, and leukocytosis. UA with ketones, VBG 7.27, and non-acute CXR. Initially attempted to close w/o drip, but given persistent gap and LA, plan made to start insulin GTT and transfer here from [place]. On arrival to the ICU, patient reports feeling better than when coming in, sitting up in bed. Notes some abdominal tenderness thought to be 2/2 heaving, otherwise denies fevers, chills, SOB, URI symptoms, diarrhea, or UTI symptoms. Review of Systems:      14 pt Review of Systems was performed and negative except for what was mentioned above    Objective:   No intake or output data in the 24 hours ending 12/29/22 0109   Vitals:   Vitals:    12/29/22 0033 12/29/22 0053   BP: 119/78    Temp: 98.6 °F (37 °C)    TempSrc: Oral    Weight:  193 lb 9 oz (87.8 kg)   Height:  5' 5\" (1.651 m)       Medications Prior to Admission     Prior to Admission medications    Not on File       Physical Exam: Need 8 Elements   Exam is performed through direct observation by video conferencing along with assistance from the bedside nurse and a bluetooth stethoscope. General: NAD  Eyes: EOMI  ENT: neck supple  Cardiovascular: Regular rate. Respiratory: Clear to auscultation  Gastrointestinal: Soft, slightly tender, No guarding   Genitourinary: no suprapubic tenderness   Musculoskeletal: No edema  Skin: warm, dry  Neuro: Alert. Psych: Mood appropriate. Past Medical History:   PMHx No past medical history on file. PSHX:  has no past surgical history on file.   Allergies: No Known Allergies  Fam HX: Hx of Diabetes  Soc HX:   Social History     Socioeconomic History    Marital status:        Medications:   Medications:    enoxaparin  40 mg SubCUTAneous Daily      Infusions:    dextrose 5% and 0.45% NaCl with KCl 20 mEq      sodium chloride       PRN Meds: dextrose bolus, 125 mL, PRN   Or  dextrose bolus, 250 mL, PRN  potassium chloride, 10 mEq, PRN  magnesium sulfate, 1,000 mg, PRN  sodium phosphate IVPB, 10 mmol, PRN   Or  sodium phosphate IVPB, 15 mmol, PRN   Or  sodium phosphate IVPB, 20 mmol, PRN  polyethylene glycol, 17 g, Daily PRN  dextrose 5% and 0.45% NaCl with KCl 20 mEq, , Continuous PRN  acetaminophen, 650 mg, Q4H PRN  ondansetron, 4 mg, Q8H PRN        Labs and Imaging   XR CHEST PORTABLE    Result Date: 12/28/2022  EXAMINATION: ONE XRAY VIEW OF THE CHEST 12/28/2022 7:45 pm COMPARISON: None. HISTORY: ORDERING SYSTEM PROVIDED HISTORY: chest pain TECHNOLOGIST PROVIDED HISTORY: Reason for exam:->chest pain Reason for Exam: nausea, hyperglycemia FINDINGS: No lung infiltrate or consolidation. No pneumothorax or pleural effusion. Heart size is normal.     No acute abnormality. CBC:   Recent Labs     12/28/22  1645   WBC 19.0*   HGB 13.5        BMP:    Recent Labs     12/28/22  1645 12/28/22  1946 12/28/22  2204 12/28/22  2315   * 138  --   --    K 4.8 3.6  --   --    CL 97* 106  --   --    CO2 16* 13*  --   --    BUN 20 18  --   --    CREATININE 0.7 0.7  --   --    GLUCOSE 488* 362* 287 232     Hepatic:   Recent Labs     12/28/22  1645   AST 13*   ALT 11   BILITOT 0.7   ALKPHOS 90     Lipids: No results found for: CHOL, HDL, TRIG  Hemoglobin A1C: No results found for: LABA1C  TSH: No results found for: TSH  Troponin: No results found for: TROPONINT  Lactic Acid: No results for input(s): LACTA in the last 72 hours. BNP: No results for input(s): PROBNP in the last 72 hours.   UA:  Lab Results   Component Value Date/Time    NITRU NEGATIVE 12/28/2022 04:45 PM    COLORU YELLOW 12/28/2022 04:45 PM    CLARITYU CLEAR 12/28/2022 04:45 PM    SPECGRAV 1.025 12/28/2022 04:45 PM    LEUKOCYTESUR NEGATIVE 12/28/2022 04:45 PM    UROBILINOGEN 0.2 12/28/2022 04:45 PM    BILIRUBINUR NEGATIVE 12/28/2022 04:45 PM    BLOODU NEGATIVE 12/28/2022 04:45 PM    KETUA >80 12/28/2022 04:45 PM     Urine Cultures: No results found for: [place]  Blood Cultures: No results found for: BC  No results found for: BLOODCULT2  Organism: No results found for: [facility]    Personally reviewed Lab Studies, Imaging, and discussed with team.      I personally saw the patient and independently provided 59 minutes of non-concurrent critical care out of the total shared critical care time provided.     Electronically signed by Gene Mathis MD on 12/29/2022 at 1:09 AM

## 2022-12-29 NOTE — ED PROVIDER NOTES
Emergency Department Encounter    Patient: Iris Hurtado  MRN: [id number]  : 1995  Date of Evaluation: 2022  ED Provider:  Deacon Fischer MD    Briefly, Iris Hurtado is a 33 y.o. female presented to the emergency department with hyperglycemia. She is a type I diabetic and does use an insulin pump. She was seen by previous physician. Please see that note for full HPI.     I have reviewed and interpreted all of the currently available lab results from this visit (if applicable)  Results for orders placed or performed during the hospital encounter of 22   CBC with Auto Differential   Result Value Ref Range    WBC 19.0 (H) 4.0 - 10.5 K/CU MM    RBC 4.75 4.2 - 5.4 M/CU MM    Hemoglobin 13.5 12.5 - 16.0 GM/DL    Hematocrit 40.8 37 - 47 %    MCV 85.9 78 - 100 FL    MCH 28.4 27 - 31 PG    MCHC 33.1 32.0 - 36.0 %    RDW 13.4 11.7 - 14.9 %    Platelets 378 775 - 146 K/CU MM    MPV 10.8 7.5 - 11.1 FL    Differential Type AUTOMATED DIFFERENTIAL     Segs Relative 88.0 (H) 36 - 66 %    Lymphocytes % 8.8 (L) 24 - 44 %    Monocytes % 2.4 0 - 4 %    Eosinophils % 0.1 0 - 3 %    Basophils % 0.3 0 - 1 %    Segs Absolute 16.7 K/CU MM    Lymphocytes Absolute 1.7 K/CU MM    Monocytes Absolute 0.5 K/CU MM    Eosinophils Absolute 0.0 K/CU MM    Basophils Absolute 0.1 K/CU MM    Immature Neutrophil % 0.4 0 - 0.43 %    Total Immature Neutrophil 0.07 K/CU MM   Comprehensive Metabolic Panel w/ Reflex to MG   Result Value Ref Range    Sodium 134 (L) 135 - 145 MMOL/L    Potassium 4.8 3.5 - 5.1 MMOL/L    Chloride 97 (L) 99 - 110 mMol/L    CO2 16 (L) 21 - 32 MMOL/L    BUN 20 6 - 23 MG/DL    Creatinine 0.7 0.6 - 1.1 MG/DL    Est, Glom Filt Rate >60 >60 mL/min/1.73m2    Glucose 488 (HH) 70 - 99 MG/DL    Calcium 10.3 8.3 - 10.6 MG/DL    Albumin 4.4 3.4 - 5.0 GM/DL    Total Protein 7.7 6.4 - 8.2 GM/DL    Total Bilirubin 0.7 0.0 - 1.0 MG/DL    ALT 11 10 - 40 U/L    AST 13 (L) 15 - 37 IU/L    Alkaline Phosphatase 90 40 - 129 IU/L    Anion Gap 21 (H) 4 - 16   Beta-Hydroxybutyrate   Result Value Ref Range    Beta-Hydroxybutyrate >20.8 (H) 0.0 - 3.0 MG/DL   Urinalysis   Result Value Ref Range    Color, UA YELLOW YELLOW    Clarity, UA CLEAR CLEAR    Glucose, Urine >1,000 (A) NEGATIVE MG/DL    Bilirubin Urine NEGATIVE NEGATIVE MG/DL    Ketones, Urine >80 (A) NEGATIVE MG/DL    Specific Gravity, UA 1.025 1.001 - 1.035    Blood, Urine NEGATIVE NEGATIVE    pH, Urine 5.5 5.0 - 8.0    Protein, UA NEGATIVE NEGATIVE MG/DL    Urobilinogen, Urine 0.2 0.2 - 1.0 MG/DL    Nitrite Urine, Quantitative NEGATIVE NEGATIVE    Leukocyte Esterase, Urine NEGATIVE NEGATIVE    Urinalysis Comments       Microscopic exam not performed based on chemical results unless requested in original order. HCG Serum, Qualitative   Result Value Ref Range    hCG Qual NEGATIVE    Lactate, Sepsis   Result Value Ref Range    Lactic Acid, Sepsis 4.0 (HH) 0.5 - 1.9 mMOL/L   POCT Glucose   Result Value Ref Range    POC Glucose 474 (H) 70 - 99 MG/DL   POCT Venous   Result Value Ref Range    pH, Rubio 7.27 (L) 7.32 - 7.42    pCO2, Rubio 30.0 (L) 38 - 52 mmHG    pO2, Rubio 67.8 (H) 28 - 48 mmHG    Base Exc, Mixed 11.7 (H) 0 - 2.3    Base Excess MINUS 0 - 2.4    HCO3, Venous 13.8 (L) 19 - 25 MMOL/L    O2 Sat, Rubio 90.9 (H) 50 - 70 %    CO2 Content 14.8 (L) 19 - 24 MMOL/L    Source: Venous    POCT Glucose   Result Value Ref Range    POC Glucose 350 (H) 70 - 99 MG/DL      Radiographs (if obtained):    [] Radiologist's Report Reviewed:  XR CHEST PORTABLE    (Results Pending)       MDM:    81-year-old female with a history of type 1 diabetes presents with high blood sugars as well as abdominal pain and nausea. She was seen by previous physician. Please see that note for full details of care at home transition of care. The patient has had about a day of elevated blood sugars above 400s.   She has tried measures like switching the site of her pump as well as using a different bottle of insulin without improvement in her symptoms. She was brought into the emergency department seen by previous physician. The work-up initially by the previous physician did show a pH of 7.27 on VBG. She also had an elevated anion gap of 21 with blood sugar above 400. Previous physician did try a couple of fluid boluses as well as 2 boluses of insulin at 10 units each. Her pump was kept on. However her blood sugars seem to remain high. Presented to me and I repeated a BMP which continues to show an anion gap of 19. At this point, we will turn off her insulin pump and she will be placed on insulin drip and admitted for DKA. Agreeable with admission. She does feel improved at this time. Her initial lactic in the emergency department was 4 and a repeat lactic was taken was 3.3. Continues to be mildly tachycardic. I consulted the the ICU attending for [facility] as she has been accepted there. Plan is to transfer her there for further evaluation management      She is on the Insulin drip at this time    Total critical care time today provided was 25 minutes. This excludes seperately billable procedure. Critical care time provided for dka that required close evaluation and/or intervention with concern for patient decompensation. Clinical Impression:  1. Diabetic ketoacidosis without coma associated with type 1 diabetes mellitus ([facility] [address]      Disposition referral (if applicable):  No follow-up provider specified. Disposition medications (if applicable):  New Prescriptions    No medications on file       Comment: Please note this report has been produced using speech recognition software and may contain errors related to that system including errors in grammar, punctuation, and spelling, as well as words and phrases that may be inappropriate. Efforts were made to edit the dictations.        Jodi Pyle MD  12/28/22 9248

## 2022-12-29 NOTE — DISCHARGE SUMMARY
V2.0  Discharge Summary    Name:  Piter Clifton /Age/Sex: 1995 (33 y.o. female)   Admit Date: 2022  Discharge Date: 22    MRN & CSN:  [id number] & [id number] Encounter Date and Time 22 4:04 PM EST    Attending:  Mariama Alcala MD Discharging Provider: NIGHAT Parra [facility] Course:     Brief HPI: Piter Clifton is a 33 y.o. female who presented with DKA. Patient is from [facility] and was in town visiting family for the holidays when she noticed that her blood sugars were rising. She subsequently had nausea/vomiting and hyperglycemia. She attempted to manage her hyperglycemia on her own with her insulin pump but subsequently presented to the emergency department. Upon arrival to the emergency department the patient was found to be in DKA. She was started on insulin drip and admitted to the ICU. She has been transitioned to sliding scale and is requesting to go home. She reports that she would feel more comfortable with subcutaneous long-acting and sliding scale insulin until she can return back home and meet with her endocrinologist on Tuesday. 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 We had a long discussion with pharmacy about transitioning to long-acting and sliding scale.     Brief Problem Based Course:   DKA - resolved  -Anion gap 11 this a.m.  -Discontinued insulin drip, started Lantus and insulin sliding scale  -Sent prescriptions to outpatient pharmacy to be filled so patient could be discharged with insulin and hand  -Educated and answered all the patient's questions regarding her insulin  - was at the bedside  -Patient has an appointment with her endocrinologist on Tuesday, 1/3/2023  -She is aware that she is not to use her home insulin pump until she meets with her endocrinologist  Sjogren's  -Continue home Plaquenil      The patient expressed appropriate understanding of, and agreement with the discharge recommendations, medications, and plan. Consults this admission:  None    Discharge Diagnosis:   DKA, type 1, not at goal [facility])    Discharge Instruction:   Follow up appointments: Endocrinology Tuesday, 1/3/2023  Primary care physician: No primary care provider on file.  within 2 weeks  Diet: diabetic diet   Activity: activity as tolerated  Disposition: Discharged to:   [x]Home, [][facility], []SNF, []Acute Rehab, []Hospice   Condition on discharge: Stable  Labs and Tests to be Followed up as an outpatient by PCP or Specialist: None    Discharge Medications:        Medication List        START taking these medications      HumaLOG 100 UNIT/ML injection vial  Generic drug: insulin lispro  Inject 10 units subcutaneously TID with meals in addition to sliding scale as directed below:=No Insulin, 200-249=4 Units, 250-299=8 Units, 300-349=12 Units, Over 349=16 Units and notify physician     insulin glargine 100 UNIT/ML injection vial  Commonly known as: LANTUS  Inject 45 Units into the skin daily  Start taking on: December 30, 2022            CONTINUE taking these medications      Claritin 10 MG tablet  Generic drug: loratadine     ferrous sulfate 325 (65 Fe) MG tablet  Commonly known as: IRON 325     hydroxychloroquine 200 MG tablet  Commonly known as: PLAQUENIL     methocarbamol 750 MG tablet  Commonly known as: ROBAXIN     Sprintec 28 0.25-35 MG-MCG per tablet  Generic drug: norgestimate-ethinyl estradiol     Vyvanse 40 MG Caps  Generic drug: Lisdexamfetamine Dimesylate               Where to Get Your Medications        These medications were sent to [address] [place] [address] [address], [address] [facility] [id number]      Phone: [phone]   HumaLOG 100 UNIT/ML injection vial  insulin glargine 100 UNIT/ML injection vial        Objective Findings at Discharge:   /73   Pulse (!) 105   Temp 98.9 °F (37.2 °C) (Oral)   Resp 19   Ht 5' 5\" (1.651 m)   Wt 193 lb 9 oz (87.8 kg)   SpO2 99%   BMI 32.21 kg/m²       Physical Exam:   General: NAD  Eyes: EOMI  ENT: neck supple  Cardiovascular: Regular rate. Respiratory: Clear to auscultation  Gastrointestinal: Soft, non tender  Genitourinary: no suprapubic tenderness  Musculoskeletal: No edema  Skin: warm, dry  Neuro: Alert. Psych: Mood appropriate. Labs and Imaging   XR CHEST PORTABLE    Result Date: 12/28/2022  EXAMINATION: ONE XRAY VIEW OF THE CHEST 12/28/2022 7:45 pm COMPARISON: None. HISTORY: ORDERING SYSTEM PROVIDED HISTORY: chest pain TECHNOLOGIST PROVIDED HISTORY: Reason for exam:->chest pain Reason for Exam: nausea, hyperglycemia FINDINGS: No lung infiltrate or consolidation. No pneumothorax or pleural effusion. Heart size is normal.     No acute abnormality. CBC:   Recent Labs     12/28/22  1645 12/29/22  0700   WBC 19.0* 15.8*   HGB 13.5 11.0*    287     BMP:    Recent Labs     12/28/22  1645 12/28/22  1946 12/28/22  2204 12/28/22  2315 12/29/22  0131   * 138  --   --  139   K 4.8 3.6  --   --  4.2   CL 97* 106  --   --  110   CO2 16* 13*  --   --  18*   BUN 20 18  --   --  14   CREATININE 0.7 0.7  --   --  0.5*   GLUCOSE 488* 362* 287 232 171*     Hepatic:   Recent Labs     12/28/22  1645   AST 13*   ALT 11   BILITOT 0.7   ALKPHOS 90     Lipids: No results found for: CHOL, HDL, TRIG  Hemoglobin A1C: No results found for: LABA1C  TSH: No results found for: TSH  Troponin: No results found for: TROPONINT  Lactic Acid: No results for input(s): LACTA in the last 72 hours. BNP: No results for input(s): PROBNP in the last 72 hours.   UA:  Lab Results   Component Value Date/Time    NITRU NEGATIVE 12/28/2022 04:45 PM    COLORU YELLOW 12/28/2022 04:45 PM    CLARITYU CLEAR 12/28/2022 04:45 PM    SPECGRAV 1.025 12/28/2022 04:45 PM    LEUKOCYTESUR NEGATIVE 12/28/2022 04:45 PM    UROBILINOGEN 0.2 12/28/2022 04:45 PM    BILIRUBINUR NEGATIVE 12/28/2022 04:45 PM    BLOODU NEGATIVE 12/28/2022 04:45 PM    KETUA >80 12/28/2022 04:45 PM     Urine Cultures: No results found for: LABURIN  Blood Cultures: No results found for: BC  No results found for: BLOODCULT2  Organism: No results found for: ORG    Time Spent Discharging patient 33 minutes    Electronically signed by NIGHAT Gomez CNP on 12/29/2022 at 4:04 PM

## 2022-12-29 NOTE — PROGRESS NOTES
Outpatient Pharmacy Progress Note for Meds-to-Beds    Total number of Prescriptions Filled: 4  The following medications were dispensed to the patient during the discharge process:  Humalog insulin  Lantus insulin  Insulin pen needles  Alcohol prep wipes    Additional Documentation:  Patient picked-up the medication(s) in the OP Pharmacy      Thank you for letting us serve your patients.   [address]    [address], [address] [facility]    Phone: [phone]    Fax: [phone]

## 2022-12-30 NOTE — PROGRESS NOTES
Patient was managed for DKA. Patient was seen earlier by by [facility]. I agree with the assessment and plan. During the rounds patient was off the insulin drip and DKA had resolved. She was managed mostly by critical care team who later decided to discharge the patient and advised to follow up with the endocrinology. She was on pump which was switched to lantus and short acting while she sees her endocrinologist. 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